# Patient Record
Sex: FEMALE | Race: WHITE | HISPANIC OR LATINO | Employment: FULL TIME | URBAN - METROPOLITAN AREA
[De-identification: names, ages, dates, MRNs, and addresses within clinical notes are randomized per-mention and may not be internally consistent; named-entity substitution may affect disease eponyms.]

---
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
## 2024-04-29 ENCOUNTER — OFFICE VISIT (OUTPATIENT)
Dept: URGENT CARE | Facility: CLINIC | Age: 19
End: 2024-04-29
Payer: MEDICAID

## 2024-04-29 VITALS
OXYGEN SATURATION: 98 % | WEIGHT: 119.3 LBS | TEMPERATURE: 97.7 F | BODY MASS INDEX: 21.95 KG/M2 | HEART RATE: 77 BPM | RESPIRATION RATE: 16 BRPM | DIASTOLIC BLOOD PRESSURE: 64 MMHG | HEIGHT: 62 IN | SYSTOLIC BLOOD PRESSURE: 102 MMHG

## 2024-04-29 DIAGNOSIS — R19.7 DIARRHEA OF PRESUMED INFECTIOUS ORIGIN: ICD-10-CM

## 2024-04-29 DIAGNOSIS — R11.0 NAUSEA: ICD-10-CM

## 2024-04-29 DIAGNOSIS — R35.0 URINARY FREQUENCY: ICD-10-CM

## 2024-04-29 LAB
APPEARANCE UR: NORMAL
BILIRUB UR STRIP-MCNC: NORMAL MG/DL
COLOR UR AUTO: NORMAL
GLUCOSE UR STRIP.AUTO-MCNC: NORMAL MG/DL
KETONES UR STRIP.AUTO-MCNC: 40 MG/DL
LEUKOCYTE ESTERASE UR QL STRIP.AUTO: NORMAL
NITRITE UR QL STRIP.AUTO: NORMAL
PH UR STRIP.AUTO: 5.5 [PH] (ref 5–8)
POCT INT CON NEG: NEGATIVE
POCT INT CON POS: POSITIVE
POCT URINE PREGNANCY TEST: NEGATIVE
PROT UR QL STRIP: NORMAL MG/DL
RBC UR QL AUTO: NORMAL
SP GR UR STRIP.AUTO: 1.03
UROBILINOGEN UR STRIP-MCNC: 0.2 MG/DL

## 2024-04-29 RX ORDER — ONDANSETRON 4 MG/1
4 TABLET, FILM COATED ORAL EVERY 4 HOURS PRN
Qty: 20 TABLET | Refills: 0 | Status: SHIPPED | OUTPATIENT
Start: 2024-04-29 | End: 2024-05-04

## 2024-04-29 ASSESSMENT — ENCOUNTER SYMPTOMS
HEADACHES: 0
FLATUS: 1
BLOOD IN STOOL: 0
FEVER: 0
MUSCULOSKELETAL NEGATIVE: 1
SWEATS: 0
DIARRHEA: 1
HEARTBURN: 1
CARDIOVASCULAR NEGATIVE: 1
RESPIRATORY NEGATIVE: 1
BLOATING: 1
ABDOMINAL PAIN: 1
NAUSEA: 1
COUGH: 0
CHILLS: 0
MYALGIAS: 0
VOMITING: 0
CONSTIPATION: 0
ARTHRALGIAS: 0
CONSTITUTIONAL NEGATIVE: 1

## 2024-04-29 NOTE — LETTER
April 29, 2024         Patient: Merle Mercedes   YOB: 2005   Date of Visit: 4/29/2024           To Whom it May Concern:    Merle Mercedes was seen in my clinic on 4/29/2024. Please excuse any absences from work this week due to acute illness.      If you have any questions or concerns, please don't hesitate to call.        Sincerely,           Remigio Sandra P.A.-C.  Electronically Signed

## 2024-04-29 NOTE — PROGRESS NOTES
Subjective     Merle Mercedes is a very pleasant 18 y.o. female who presents with Nausea, Diarrhea (X 4 days), GI Problem, and Bloated            Diarrhea   This is a new problem. The current episode started in the past 7 days. The problem occurs 5 to 10 times per day. The problem has been unchanged. The stool consistency is described as Watery. Associated symptoms include abdominal pain, bloating and increased flatus. Pertinent negatives include no arthralgias, chills, coughing, fever, headaches, myalgias, sweats, URI or vomiting. There are no known risk factors. She has tried nothing for the symptoms. The treatment provided no relief.       PMH:  has no past medical history on file.  MEDS:   Current Outpatient Medications:     ondansetron (ZOFRAN) 4 MG Tab tablet, Take 1 Tablet by mouth every four hours as needed for Nausea/Vomiting for up to 5 days., Disp: 20 Tablet, Rfl: 0    levonorgestrel (MIRENA, 52 MG,) 20 MCG/DAY IUD, 1 Each by Intrauterine route one time., Disp: , Rfl:   ALLERGIES:   Allergies   Allergen Reactions    Ibuprofen Rash     Possible hives due to ibuprofen use     SURGHX: No past surgical history on file.  SOCHX:  reports that she has never smoked. She has never used smokeless tobacco. She reports that she does not drink alcohol and does not use drugs.  FH: family history is not on file.        Review of Systems   Constitutional: Negative.  Negative for chills and fever.   HENT: Negative.     Respiratory: Negative.  Negative for cough.    Cardiovascular: Negative.    Gastrointestinal:  Positive for abdominal pain, bloating, diarrhea, flatus, heartburn and nausea. Negative for blood in stool, constipation, melena and vomiting.   Genitourinary: Negative.    Musculoskeletal: Negative.  Negative for arthralgias and myalgias.   Neurological:  Negative for headaches.       Medications, Allergies, and current problem list reviewed today in Epic           Objective     /64   Pulse 77   Temp  "36.5 °C (97.7 °F) (Temporal)   Resp 16   Ht 1.575 m (5' 2\")   Wt 54.1 kg (119 lb 4.8 oz)   SpO2 98%   BMI 21.82 kg/m²      Physical Exam  Vitals and nursing note reviewed.   Constitutional:       General: She is not in acute distress.     Appearance: Normal appearance. She is well-developed. She is not ill-appearing, toxic-appearing or diaphoretic.   HENT:      Head: Normocephalic and atraumatic.      Right Ear: Tympanic membrane, ear canal and external ear normal.      Left Ear: Tympanic membrane, ear canal and external ear normal.      Nose: Nose normal. No congestion or rhinorrhea.      Mouth/Throat:      Mouth: Mucous membranes are moist.      Pharynx: No oropharyngeal exudate or posterior oropharyngeal erythema.   Eyes:      General:         Right eye: No discharge.         Left eye: No discharge.      Conjunctiva/sclera: Conjunctivae normal.   Cardiovascular:      Rate and Rhythm: Normal rate and regular rhythm.      Pulses: Normal pulses.      Heart sounds: Normal heart sounds.   Pulmonary:      Effort: Pulmonary effort is normal. No respiratory distress.      Breath sounds: Normal breath sounds. No wheezing, rhonchi or rales.   Abdominal:      General: Abdomen is flat. Bowel sounds are normal. There is no distension.      Palpations: Abdomen is soft.      Tenderness: There is no abdominal tenderness. There is no right CVA tenderness, left CVA tenderness, guarding or rebound. Negative signs include Suarez's sign and McBurney's sign.   Musculoskeletal:      Cervical back: Normal range of motion and neck supple.      Right lower leg: No edema.      Left lower leg: No edema.   Lymphadenopathy:      Cervical: No cervical adenopathy.   Skin:     General: Skin is warm and dry.   Neurological:      General: No focal deficit present.      Mental Status: She is alert and oriented to person, place, and time. Mental status is at baseline.   Psychiatric:         Mood and Affect: Mood normal.         Behavior: " Behavior normal.         Thought Content: Thought content normal.         Judgment: Judgment normal.                             Assessment & Plan     This is a very pleasant 18-year-old female presenting with 3 days of nausea, diarrhea, bloating and increased gas.  Diarrhea was watery without blood or mucus.  She was having some generalized abdominal pain but no sharp stabbing or localizing pain.  No vomiting, fever, bloody stools.  Slight urinary frequency but no dysuria, hematuria, pelvic pain or vaginitis.  Symptoms have improved and now she is eating and drinking.  She does not drink, smoke or other risk factors.  No pertinent past abdominal surgical history.  Vital signs are normal.  Abdomen is soft, nondistended with normal bowel sounds.  No tenderness, rebound or guarding.  Negative Suarez sign and no McBurney's point tenderness.  No CVA tenderness or flank pain.  Entirety of exam benign/reassuring.  In clinic urinalysis and pregnancy negative.  Viral gastroenteritis versus foodborne enteritis.  Certainly there are no signs of acute abdomen today.  GI cocktail given in clinic.  Nausea medication to pharmacy.  OTC meds, fluids, conservative measures and brat diet.  Note for work provided.  AVS illustrating acute abdomen symptoms provided to patient.    1. Urinary frequency  POCT PREGNANCY    POCT Urinalysis    ondansetron (ZOFRAN) 4 MG Tab tablet      2. Diarrhea of presumed infectious origin  ondansetron (ZOFRAN) 4 MG Tab tablet      3. Nausea  ondansetron (ZOFRAN) 4 MG Tab tablet          I personally reviewed prior external notes and test results pertinent to today's visit. Return to clinic or go to ED if symptoms worsen or persist. Red flag symptoms and indications for ED discussed at length. Patient/Parent/Guardian voices understanding.  AVS with post-visit instructions printed and provided or given verbally.  Follow-up with your primary care provider in 3-5 days. All side effects and potential  interactions of prescribed medication discussed including allergic response, GI upset, tendon injury, rash, sedation, OCP effectiveness, etc.    Please note that this dictation was created using voice recognition software. I have made every reasonable attempt to correct obvious errors, but I expect that there are errors of grammar and possibly content that I did not discover before finalizing the note.

## 2024-05-17 ENCOUNTER — OFFICE VISIT (OUTPATIENT)
Dept: URGENT CARE | Facility: CLINIC | Age: 19
End: 2024-05-17
Payer: MEDICAID

## 2024-05-17 VITALS
HEART RATE: 70 BPM | DIASTOLIC BLOOD PRESSURE: 52 MMHG | SYSTOLIC BLOOD PRESSURE: 102 MMHG | TEMPERATURE: 97.8 F | BODY MASS INDEX: 22.32 KG/M2 | RESPIRATION RATE: 16 BRPM | WEIGHT: 121.3 LBS | OXYGEN SATURATION: 98 % | HEIGHT: 62 IN

## 2024-05-17 DIAGNOSIS — H66.001 NON-RECURRENT ACUTE SUPPURATIVE OTITIS MEDIA OF RIGHT EAR WITHOUT SPONTANEOUS RUPTURE OF TYMPANIC MEMBRANE: ICD-10-CM

## 2024-05-17 DIAGNOSIS — B37.31 VULVOVAGINAL CANDIDIASIS: ICD-10-CM

## 2024-05-17 PROCEDURE — 3074F SYST BP LT 130 MM HG: CPT

## 2024-05-17 PROCEDURE — 3078F DIAST BP <80 MM HG: CPT

## 2024-05-17 PROCEDURE — 99213 OFFICE O/P EST LOW 20 MIN: CPT

## 2024-05-17 RX ORDER — FLUCONAZOLE 150 MG/1
150 TABLET ORAL DAILY
Qty: 1 TABLET | Refills: 0 | Status: SHIPPED | OUTPATIENT
Start: 2024-05-17

## 2024-05-17 RX ORDER — AMOXICILLIN AND CLAVULANATE POTASSIUM 875; 125 MG/1; MG/1
1 TABLET, FILM COATED ORAL 2 TIMES DAILY
Qty: 14 TABLET | Refills: 0 | Status: SHIPPED | OUTPATIENT
Start: 2024-05-17 | End: 2024-05-24

## 2024-05-18 NOTE — PROGRESS NOTES
"Subjective:   Merle Mercedes is a 18 y.o. female who presents for Otalgia (Both ears are painful started about 3-4 days ago and thinks possible sinus infection )      HPI:    Patient presents to urgent care with concerns of bilateral ear pain or sinus infection.   States right ear is more painful than the left ear. Pain started 3-4 days ago. Reports sinus symptoms started about one week ago. Denies fever or chills. Denies otorrhea.  Pain radiates to her jaw. Has sinus headache, denies neck pain, photophobia  Reports sinus surgery 6 months ago and a history of recurrent OM . Employed as a . Her son has also been sick with similar illness. Tolerating solids and fluids. Reports normal urinary output. Denies rash.    ROS As above in HPI    Medications:    Current Outpatient Medications on File Prior to Visit   Medication Sig Dispense Refill    levonorgestrel (MIRENA, 52 MG,) 20 MCG/DAY IUD 1 Each by Intrauterine route one time.       No current facility-administered medications on file prior to visit.        Allergies:   Ibuprofen    Problem List:   There is no problem list on file for this patient.       Surgical History:  No past surgical history on file.    Past Social Hx:   Social History     Tobacco Use    Smoking status: Never    Smokeless tobacco: Never   Vaping Use    Vaping status: Never Used   Substance Use Topics    Alcohol use: Never    Drug use: Never          Problem list, medications, and allergies reviewed by myself today in Epic.     Objective:     /52 (BP Location: Left arm, Patient Position: Sitting)   Pulse 70   Temp 36.6 °C (97.8 °F) (Temporal)   Resp 16   Ht 1.575 m (5' 2\")   Wt 55 kg (121 lb 4.8 oz)   SpO2 98%   BMI 22.19 kg/m²     Physical Exam  Vitals and nursing note reviewed.   Constitutional:       General: She is not in acute distress.     Appearance: Normal appearance. She is not ill-appearing.   HENT:      Head: Normocephalic.      Right Ear: Ear canal " normal. A middle ear effusion is present. Tympanic membrane is erythematous and bulging.      Left Ear: Ear canal normal. Tympanic membrane is injected.      Nose: Congestion and rhinorrhea present. Rhinorrhea is clear.      Right Sinus: No maxillary sinus tenderness or frontal sinus tenderness.      Left Sinus: No maxillary sinus tenderness or frontal sinus tenderness.      Mouth/Throat:      Mouth: Mucous membranes are moist.      Pharynx: Oropharynx is clear. Uvula midline. Posterior oropharyngeal erythema (Mild pnd) present. No pharyngeal swelling or oropharyngeal exudate.      Tonsils: No tonsillar exudate.   Cardiovascular:      Rate and Rhythm: Normal rate and regular rhythm.      Heart sounds: Normal heart sounds. No murmur heard.     No friction rub. No gallop.   Pulmonary:      Effort: Pulmonary effort is normal. No respiratory distress.      Breath sounds: Normal breath sounds. No stridor. No wheezing, rhonchi or rales.   Chest:      Chest wall: No tenderness.   Abdominal:      General: Bowel sounds are normal.      Palpations: Abdomen is soft.   Musculoskeletal:      Cervical back: No rigidity or tenderness.   Lymphadenopathy:      Cervical: No cervical adenopathy.   Skin:     General: Skin is warm and dry.      Capillary Refill: Capillary refill takes less than 2 seconds.      Findings: No rash.   Neurological:      Mental Status: She is alert and oriented to person, place, and time.         Assessment/Plan:       Diagnosis and associated orders:   1. Non-recurrent acute suppurative otitis media of right ear without spontaneous rupture of tympanic membrane  - amoxicillin-clavulanate (AUGMENTIN) 875-125 MG Tab; Take 1 Tablet by mouth 2 times a day for 7 days.  Dispense: 14 Tablet; Refill: 0    2. Vulvovaginal candidiasis  - fluconazole (DIFLUCAN) 150 MG tablet; Take 1 Tablet by mouth every day.  Dispense: 1 Tablet; Refill: 0        Comments/MDM:     Tylenol/ibuprofen as needed for pain per package  instructions  Start otc antihistamines, Flonase, nasal rinses.  Reports infections with antibiotic use, dose Diflucan ordered  Return if no improvement in 48 to 72 hours  Follow-up with primary care advised        Return to clinic or go to ED if symptoms worsen or persist. Indications for ED discussed at length. Patient/Parent/Guardian voices understanding. Follow-up with your primary care provider in 3-5 days. Red flag symptoms discussed. All side effects of medication discussed including allergic response, GI upset, tendon injury, rash, sedation etc.    Please note that this dictation was created using voice recognition software. I have made a reasonable attempt to correct obvious errors, but I expect that there are errors of grammar and possibly content that I did not discover before finalizing the note.    This note was electronically signed by HARI Scales

## 2024-05-25 ENCOUNTER — APPOINTMENT (OUTPATIENT)
Dept: RADIOLOGY | Facility: MEDICAL CENTER | Age: 19
End: 2024-05-25
Attending: EMERGENCY MEDICINE
Payer: MEDICAID

## 2024-05-25 ENCOUNTER — HOSPITAL ENCOUNTER (EMERGENCY)
Facility: MEDICAL CENTER | Age: 19
End: 2024-05-25
Attending: EMERGENCY MEDICINE
Payer: MEDICAID

## 2024-05-25 VITALS
OXYGEN SATURATION: 97 % | BODY MASS INDEX: 21.99 KG/M2 | DIASTOLIC BLOOD PRESSURE: 57 MMHG | RESPIRATION RATE: 19 BRPM | HEIGHT: 62 IN | HEART RATE: 99 BPM | WEIGHT: 119.49 LBS | SYSTOLIC BLOOD PRESSURE: 101 MMHG | TEMPERATURE: 99.6 F

## 2024-05-25 DIAGNOSIS — R11.2 NAUSEA AND VOMITING, UNSPECIFIED VOMITING TYPE: ICD-10-CM

## 2024-05-25 DIAGNOSIS — R10.84 GENERALIZED ABDOMINAL PAIN: ICD-10-CM

## 2024-05-25 DIAGNOSIS — R19.7 DIARRHEA, UNSPECIFIED TYPE: ICD-10-CM

## 2024-05-25 DIAGNOSIS — K52.9 GASTROENTERITIS: ICD-10-CM

## 2024-05-25 LAB
ALBUMIN SERPL BCP-MCNC: 4.5 G/DL (ref 3.2–4.9)
ALBUMIN/GLOB SERPL: 1.2 G/DL
ALP SERPL-CCNC: 83 U/L (ref 30–99)
ALT SERPL-CCNC: 14 U/L (ref 2–50)
ANION GAP SERPL CALC-SCNC: 16 MMOL/L (ref 7–16)
APPEARANCE UR: CLEAR
AST SERPL-CCNC: 23 U/L (ref 12–45)
BACTERIA #/AREA URNS HPF: NEGATIVE /HPF
BASOPHILS # BLD AUTO: 0.3 % (ref 0–1.8)
BASOPHILS # BLD: 0.03 K/UL (ref 0–0.12)
BILIRUB SERPL-MCNC: 0.5 MG/DL (ref 0.1–1.5)
BILIRUB UR QL STRIP.AUTO: NEGATIVE
BUN SERPL-MCNC: 11 MG/DL (ref 8–22)
CALCIUM ALBUM COR SERPL-MCNC: 8.7 MG/DL (ref 8.5–10.5)
CALCIUM SERPL-MCNC: 9.1 MG/DL (ref 8.5–10.5)
CHLORIDE SERPL-SCNC: 102 MMOL/L (ref 96–112)
CO2 SERPL-SCNC: 18 MMOL/L (ref 20–33)
COLOR UR: ABNORMAL
CREAT SERPL-MCNC: 0.82 MG/DL (ref 0.5–1.4)
EOSINOPHIL # BLD AUTO: 0.01 K/UL (ref 0–0.51)
EOSINOPHIL NFR BLD: 0.1 % (ref 0–6.9)
EPI CELLS #/AREA URNS HPF: ABNORMAL /HPF
ERYTHROCYTE [DISTWIDTH] IN BLOOD BY AUTOMATED COUNT: 40.3 FL (ref 35.9–50)
FLUAV RNA SPEC QL NAA+PROBE: NEGATIVE
FLUBV RNA SPEC QL NAA+PROBE: NEGATIVE
GFR SERPLBLD CREATININE-BSD FMLA CKD-EPI: 104 ML/MIN/1.73 M 2
GLOBULIN SER CALC-MCNC: 3.7 G/DL (ref 1.9–3.5)
GLUCOSE SERPL-MCNC: 97 MG/DL (ref 65–99)
GLUCOSE UR STRIP.AUTO-MCNC: NEGATIVE MG/DL
HCG SERPL QL: NEGATIVE
HCT VFR BLD AUTO: 44.9 % (ref 37–47)
HGB BLD-MCNC: 16.1 G/DL (ref 12–16)
HYALINE CASTS #/AREA URNS LPF: ABNORMAL /LPF
IMM GRANULOCYTES # BLD AUTO: 0.05 K/UL (ref 0–0.11)
IMM GRANULOCYTES NFR BLD AUTO: 0.5 % (ref 0–0.9)
KETONES UR STRIP.AUTO-MCNC: 40 MG/DL
LACTATE SERPL-SCNC: 1.2 MMOL/L (ref 0.5–2)
LEUKOCYTE ESTERASE UR QL STRIP.AUTO: ABNORMAL
LIPASE SERPL-CCNC: 18 U/L (ref 11–82)
LYMPHOCYTES # BLD AUTO: 0.51 K/UL (ref 1–4.8)
LYMPHOCYTES NFR BLD: 5.6 % (ref 22–41)
MCH RBC QN AUTO: 31.1 PG (ref 27–33)
MCHC RBC AUTO-ENTMCNC: 35.9 G/DL (ref 32.2–35.5)
MCV RBC AUTO: 86.7 FL (ref 81.4–97.8)
MICRO URNS: ABNORMAL
MONOCYTES # BLD AUTO: 0.42 K/UL (ref 0–0.85)
MONOCYTES NFR BLD AUTO: 4.6 % (ref 0–13.4)
NEUTROPHILS # BLD AUTO: 8.08 K/UL (ref 1.82–7.42)
NEUTROPHILS NFR BLD: 88.9 % (ref 44–72)
NITRITE UR QL STRIP.AUTO: NEGATIVE
NRBC # BLD AUTO: 0 K/UL
NRBC BLD-RTO: 0 /100 WBC (ref 0–0.2)
PH UR STRIP.AUTO: 6 [PH] (ref 5–8)
PLATELET # BLD AUTO: 271 K/UL (ref 164–446)
PMV BLD AUTO: 10.1 FL (ref 9–12.9)
POTASSIUM SERPL-SCNC: 3.9 MMOL/L (ref 3.6–5.5)
PROT SERPL-MCNC: 8.2 G/DL (ref 6–8.2)
PROT UR QL STRIP: 30 MG/DL
RBC # BLD AUTO: 5.18 M/UL (ref 4.2–5.4)
RBC # URNS HPF: ABNORMAL /HPF
RBC UR QL AUTO: ABNORMAL
RSV RNA SPEC QL NAA+PROBE: NEGATIVE
SARS-COV-2 RNA RESP QL NAA+PROBE: NOTDETECTED
SODIUM SERPL-SCNC: 136 MMOL/L (ref 135–145)
SP GR UR STRIP.AUTO: 1.03
UROBILINOGEN UR STRIP.AUTO-MCNC: 0.2 MG/DL
WBC # BLD AUTO: 9.1 K/UL (ref 4.8–10.8)
WBC #/AREA URNS HPF: ABNORMAL /HPF

## 2024-05-25 RX ORDER — SODIUM CHLORIDE 9 MG/ML
1000 INJECTION, SOLUTION INTRAVENOUS ONCE
Status: COMPLETED | OUTPATIENT
Start: 2024-05-25 | End: 2024-05-25

## 2024-05-25 RX ORDER — ACETAMINOPHEN 500 MG
1000 TABLET ORAL ONCE
Status: DISCONTINUED | OUTPATIENT
Start: 2024-05-25 | End: 2024-05-25 | Stop reason: HOSPADM

## 2024-05-25 RX ORDER — ONDANSETRON 2 MG/ML
4 INJECTION INTRAMUSCULAR; INTRAVENOUS ONCE
Status: COMPLETED | OUTPATIENT
Start: 2024-05-25 | End: 2024-05-25

## 2024-05-25 RX ORDER — ONDANSETRON 4 MG/1
4 TABLET, ORALLY DISINTEGRATING ORAL EVERY 6 HOURS PRN
Qty: 20 TABLET | Refills: 0 | Status: SHIPPED | OUTPATIENT
Start: 2024-05-25

## 2024-05-25 RX ORDER — SODIUM CHLORIDE 9 MG/ML
1000 INJECTION, SOLUTION INTRAVENOUS ONCE
Status: DISCONTINUED | OUTPATIENT
Start: 2024-05-25 | End: 2024-05-25

## 2024-05-25 RX ADMIN — IOHEXOL 98 ML: 350 INJECTION, SOLUTION INTRAVENOUS at 14:26

## 2024-05-25 RX ADMIN — SODIUM CHLORIDE 1000 ML: 9 INJECTION, SOLUTION INTRAVENOUS at 14:45

## 2024-05-25 RX ADMIN — SODIUM CHLORIDE 1000 ML: 9 INJECTION, SOLUTION INTRAVENOUS at 13:30

## 2024-05-25 RX ADMIN — ONDANSETRON 4 MG: 2 INJECTION INTRAMUSCULAR; INTRAVENOUS at 13:27

## 2024-05-25 NOTE — ED NOTES
ERP at bedside. ERP requesting second liter bolus and if pt's heart rate maintains below 100 pt to discharge.  Second liter bolus initiated. Pt updated on POC. Pt verbalized understanding.

## 2024-05-25 NOTE — ED NOTES
Pt connected to heart monitor. Fluid bolus running.  Pt declined tylenol at this time due to being concerned about taking anything PO.  Pt educated that sips with meds ok.  Pt states she wants to hold off on tylenol for now.  Pt educated to call if she changed her mind.  Pt verbalized understanding.

## 2024-05-25 NOTE — ED NOTES
Pt has discharge orders. Pt educated on discharge instructions.  Pt verbalizes understanding.  PIV removed. Pt ambulatory to lobby with steady gait. Pt going home with friend.

## 2024-05-25 NOTE — ED TRIAGE NOTES
"Chief Complaint   Patient presents with    Flu Like Symptoms     Pt report diarrhea, N/V started this AM. Pt also HA, back pain, chills and unable to keep anything down.    Abdominal Pain     Pt report abdominal and back pain, 8/10.        Pt ambulatory to triage. Pt A&Ox4, for above complaint.     Pt to lobby. Pt educated on alerting staff in changes to condition. Pt verbalized understanding. Protocol initiated.     /53   Pulse (!) 108   Temp 37 °C (98.6 °F) (Temporal)   Resp 18   Ht 1.575 m (5' 2\")   Wt 54.2 kg (119 lb 7.8 oz)   SpO2 97%   BMI 21.85 kg/m²     "

## 2024-05-25 NOTE — ED PROVIDER NOTES
"ED Provider Note    Scribed for Rosario Suarez M.D. by Tara Alvares. 5/25/2024, 12:55 PM.    Primary care provider: None noted  Means of arrival: Walk-in  History obtained from: Patient  History limited by: None    CHIEF COMPLAINT  Chief Complaint   Patient presents with    Flu Like Symptoms     Pt report diarrhea, N/V started this AM. Pt also HA, back pain, chills and unable to keep anything down.    Abdominal Pain     Pt report abdominal and back pain, 8/10.        HPI/ROS  Merle Mercedes is a 18 y.o. female who presents to the Emergency Department nausea, vomiting and diarrhea onset this morning. She reports associated chills, headache, body aches and diffuse abdominal pain radiating into her back. She states she has been intermittently sick with flu-like symptoms for 3 weeks.  She notes approximately 3 weeks ago she was diagnosed with \"a stomach bug\" and was discharged with Zofran.  Then approximately 1 week ago she had a sinus infection over the last week and recently finished antibiotics.  Her symptoms of abdominal pain, nausea and vomiting returned yesterday evening.  She denies any burning with urination. The patient has an IUD. She states she last ate last night at 10:30 PM.     EXTERNAL RECORDS REVIEWED  Patient seen on 4/29/2024 for diarrheal illness and prescribe Zofran.  Patient again seen at urgent care on 5/17/2024 and diagnosed with otitis media for which she was treated with Augmentin.    LIMITATION TO HISTORY   None    OUTSIDE HISTORIAN(S):  Boyfriend      PAST MEDICAL HISTORY   None noted    SURGICAL HISTORY  patient denies any surgical history    SOCIAL HISTORY  Social History     Tobacco Use    Smoking status: Never    Smokeless tobacco: Never   Vaping Use    Vaping status: Never Used   Substance Use Topics    Alcohol use: Never    Drug use: Never      Social History     Substance and Sexual Activity   Drug Use Never       FAMILY HISTORY  History reviewed. No pertinent family " "history.    CURRENT MEDICATIONS  Home Medications       Reviewed by Austin Valverde R.N. (Registered Nurse) on 05/25/24 at 1244  Med List Status: Partial     Medication Last Dose Status   fluconazole (DIFLUCAN) 150 MG tablet  Active   levonorgestrel (MIRENA, 52 MG,) 20 MCG/DAY IUD  Active                    ALLERGIES  Allergies   Allergen Reactions    Ibuprofen Rash     Possible hives due to ibuprofen use       PHYSICAL EXAM  VITAL SIGNS: /62   Pulse (!) 107   Temp 37 °C (98.6 °F) (Temporal)   Resp 16   Ht 1.575 m (5' 2\")   Wt 54.2 kg (119 lb 7.8 oz)   SpO2 98%   BMI 21.85 kg/m²   Vitals reviewed by myself.  Nursing note and vitals reviewed.  Constitutional: Well-developed and well-nourished. No acute distress.   HENT: Head is normocephalic and atraumatic.  Eyes: extra-ocular movements intact  Cardiovascular: Tachycardic rate and regular rhythm. No murmur heard.  Pulmonary/Chest: Breath sounds normal. No wheezes or rales.   Abdominal: Soft and Mild tenderness in the right lower quadrant. No distention.  No involuntary guarding  Musculoskeletal: Extremities exhibit normal range of motion without edema or tenderness.   Neurological: Awake and alert  Skin: Skin is warm and dry. No rash.     DIAGNOSTIC STUDIES:  LABS  Labs Reviewed   CBC WITH DIFFERENTIAL - Abnormal; Notable for the following components:       Result Value    Hemoglobin 16.1 (*)     MCHC 35.9 (*)     Neutrophils-Polys 88.90 (*)     Lymphocytes 5.60 (*)     Neutrophils (Absolute) 8.08 (*)     Lymphs (Absolute) 0.51 (*)     All other components within normal limits   COMP METABOLIC PANEL - Abnormal; Notable for the following components:    Co2 18 (*)     Globulin 3.7 (*)     All other components within normal limits   URINALYSIS,CULTURE IF INDICATED - Abnormal; Notable for the following components:    Ketones 40 (*)     Protein 30 (*)     Leukocyte Esterase Trace (*)     Occult Blood Trace (*)     All other components within normal limits "   URINE MICROSCOPIC (W/UA) - Abnormal; Notable for the following components:    RBC 5-10 (*)     All other components within normal limits   LIPASE   HCG QUAL SERUM   LACTIC ACID   COV-2, FLU A/B, AND RSV BY PCR ([facility])   ESTIMATED GFR   URINE CULTURE(NEW)   BLOOD CULTURE   BLOOD CULTURE   POC COV-2, FLU A/B, RSV BY PCR       All labs reviewed and independently interpreted by myself    RADIOLOGY  Images independently interpreted by myself prior to radiologist review:  -CT demonstrates nonobstructive pattern, no obvious appendicitis    Final interpretation by radiology demonstrates:    CT-ABDOMEN-PELVIS WITH   Final Result      1.  Increased small bowel and colonic fluid suggesting gastroenteritis.   2.  No bowel obstruction or perforation   3.  No secondary signs of acute appendicitis, however the appendix is not visualized.   4.  Ill-defined low-attenuation lesion in the inferior RIGHT lobe liver possibly indicating hemangioma.  Consider confirmation with nonemergent ultrasound.        The radiologist's interpretation of all radiological studies have been reviewed by me.    COURSE & MEDICAL DECISION MAKING    INITIAL ASSESSMENT, ED COURSE AND PLAN    HYDRATION: Based on the patient's presentation of Acute Vomiting the patient was given IV fluids. IV Hydration was used because oral hydration was not adequate alone. Upon recheck following hydration, the patient was improved.    Patient is a 18-year-old female who presents for evaluation of nausea, vomiting and diarrhea.  Differential diagnosis includes foodborne illness, gastroenteritis, colitis, appendicitis, dehydration, electrolyte derangement.  Diagnostic workup includes labs and CT of the abdomen.    Patient's initial vitals notable for slight tachycardia.  Patient is treated with IV fluids and Zofran after which she feels greatly improved.  Labs returned are independently interpreted by myself to demonstrate:  -No leukocytosis making bacterial pathology less  likely, hemoglobin is slightly elevated likely secondary to hemoconcentration from dehydration, patient is being treated with IV fluids  - HCG negative, patient not pregnant  -Electrolytes and renal function within normal limits, metabolic panel is notable for slightly decreased bicarb likely secondary to dehydration for which patient is being treated with IV fluids  -Viral swabs negative for flu/COVID/RSV  -Urinalysis negative for infection, there are ketones and protein in the urine consistent with dehydration for which patient is being treated with IV fluids  -Labs otherwise unremarkable    CT of the abdomen returns and is consistent with gastroenteritis.  She is noted to have incidental liver lobe lesion which she is advised will need outpatient follow-up, likely hemangioma however patient needs ultrasound outpatient.  Upon reassessment patient is feeling improved and tolerating oral intake.  Therefore she is reassured and advised on management of viral gastroenteritis.  She is given strict return precautions and discharged in stable condition.    DISPOSITION AND DISCUSSIONS  I have discussed management of the patient with the following physicians and BASILIA's:  none    Discussion of management with other QHP or appropriate source(s): none     Escalation of care considered, and ultimately not performed:see above    Barriers to care at this time, including but not limited to: none.     Decision tools and prescription drugs considered including, but not limited to: see above.        FINAL IMPRESSION  1. Gastroenteritis    2. Nausea and vomiting, unspecified vomiting type    3. Generalized abdominal pain    4. Diarrhea, unspecified type          Tara LINO (Zofia), am scribing for, and in the presence of, Rosario Suarez M.D..    Electronically signed by: Tara Alvares (Zofia), 5/25/2024    IRosario M.D. personally performed the services described in this documentation, as scribed by Tara Alvares in my  presence, and it is both accurate and complete.    The note accurately reflects work and decisions made by me.  Rosario Suarez M.D.  5/25/2024  3:02 PM

## 2024-05-25 NOTE — ED NOTES
Rounded on pt. Pt's fluid bolus almost complete.  Pt's HR maintaining below 100 in the 90s.  Pt updated that once bolus complete, pt to discharge.

## 2024-05-25 NOTE — DISCHARGE INSTRUCTIONS
As we discussed the CT scan showed a small lesion in your liver that can be followed up with an outpatient ultrasound by your primary care doctor

## 2024-05-27 LAB
BACTERIA UR CULT: NORMAL
SIGNIFICANT IND 70042: NORMAL
SITE SITE: NORMAL
SOURCE SOURCE: NORMAL

## 2024-05-30 LAB
BACTERIA BLD CULT: NORMAL
BACTERIA BLD CULT: NORMAL
SIGNIFICANT IND 70042: NORMAL
SIGNIFICANT IND 70042: NORMAL
SITE SITE: NORMAL
SITE SITE: NORMAL
SOURCE SOURCE: NORMAL
SOURCE SOURCE: NORMAL

## 2025-02-14 ENCOUNTER — OFFICE VISIT (OUTPATIENT)
Dept: URGENT CARE | Facility: CLINIC | Age: 20
End: 2025-02-14
Payer: MEDICAID

## 2025-02-14 VITALS
RESPIRATION RATE: 18 BRPM | WEIGHT: 130.7 LBS | BODY MASS INDEX: 24.05 KG/M2 | DIASTOLIC BLOOD PRESSURE: 70 MMHG | HEIGHT: 62 IN | OXYGEN SATURATION: 98 % | TEMPERATURE: 98.1 F | SYSTOLIC BLOOD PRESSURE: 110 MMHG | HEART RATE: 101 BPM

## 2025-02-14 DIAGNOSIS — J06.9 VIRAL URI: ICD-10-CM

## 2025-02-14 LAB
FLUAV RNA SPEC QL NAA+PROBE: NEGATIVE
FLUBV RNA SPEC QL NAA+PROBE: NEGATIVE
RSV RNA SPEC QL NAA+PROBE: NEGATIVE
SARS-COV-2 RNA RESP QL NAA+PROBE: NEGATIVE

## 2025-02-14 PROCEDURE — 87637 SARSCOV2&INF A&B&RSV AMP PRB: CPT | Mod: QW | Performed by: NURSE PRACTITIONER

## 2025-02-14 PROCEDURE — 99213 OFFICE O/P EST LOW 20 MIN: CPT | Performed by: NURSE PRACTITIONER

## 2025-02-14 PROCEDURE — 3078F DIAST BP <80 MM HG: CPT | Performed by: NURSE PRACTITIONER

## 2025-02-14 PROCEDURE — 3074F SYST BP LT 130 MM HG: CPT | Performed by: NURSE PRACTITIONER

## 2025-02-14 ASSESSMENT — ENCOUNTER SYMPTOMS
RHINORRHEA: 1
NAUSEA: 0
HEADACHES: 1
COUGH: 1
SORE THROAT: 1
CHILLS: 1
SINUS PAIN: 1
FEVER: 0

## 2025-02-14 ASSESSMENT — FIBROSIS 4 INDEX: FIB4 SCORE: 0.48

## 2025-02-15 NOTE — PROGRESS NOTES
"Subjective:   Merle Mercedes is a 19 y.o. female who presents for Sinusitis (X1day facial pressure/ear pain/low grade fever/)      URI   This is a new problem. The current episode started yesterday. The problem has been unchanged. Associated symptoms include congestion, coughing, headaches, rhinorrhea, sinus pain and a sore throat. Pertinent negatives include no ear pain, nausea or plugged ear sensation. She has tried acetaminophen for the symptoms.       Review of Systems   Constitutional:  Positive for chills and malaise/fatigue. Negative for fever.   HENT:  Positive for congestion, rhinorrhea, sinus pain and sore throat. Negative for ear pain.    Respiratory:  Positive for cough.    Gastrointestinal:  Negative for nausea.   Neurological:  Positive for headaches.       Medications:    fluconazole  Mirena (52 MG) Iud  ondansetron Tbdp    Allergies: Ibuprofen    Problem List: Merle Mercedes does not have a problem list on file.    Surgical History:  No past surgical history on file.    Past Social Hx: Merle Mercedes  reports that she has never smoked. She has never used smokeless tobacco. She reports that she does not drink alcohol and does not use drugs.     Past Family Hx:  Merle Mercedes family history is not on file.     Problem list, medications, and allergies reviewed by myself today in Epic.     Objective:     /70   Pulse (!) 101   Temp 36.7 °C (98.1 °F) (Temporal)   Resp 18   Ht 1.575 m (5' 2\")   Wt 59.3 kg (130 lb 11.2 oz)   LMP  (Exact Date)   SpO2 98%   BMI 23.91 kg/m²     Physical Exam  Vitals and nursing note reviewed.   Constitutional:       General: She is not in acute distress.     Appearance: She is well-developed.   HENT:      Head: Normocephalic and atraumatic.      Right Ear: Tympanic membrane and external ear normal.      Left Ear: Tympanic membrane and external ear normal.      Nose: Nose normal.      Right Sinus: No maxillary sinus tenderness or frontal sinus " tenderness.      Left Sinus: No maxillary sinus tenderness or frontal sinus tenderness.      Mouth/Throat:      Mouth: Mucous membranes are moist.      Pharynx: Uvula midline. No posterior oropharyngeal erythema.      Tonsils: No tonsillar exudate or tonsillar abscesses.   Eyes:      General:         Right eye: No discharge.         Left eye: No discharge.      Conjunctiva/sclera: Conjunctivae normal.   Cardiovascular:      Rate and Rhythm: Normal rate.   Pulmonary:      Effort: Pulmonary effort is normal. No respiratory distress.      Breath sounds: Normal breath sounds.   Abdominal:      General: There is no distension.   Musculoskeletal:         General: Normal range of motion.   Skin:     General: Skin is warm and dry.   Neurological:      General: No focal deficit present.      Mental Status: She is alert and oriented to person, place, and time. Mental status is at baseline.      Gait: Gait (gait at baseline) normal.   Psychiatric:         Judgment: Judgment normal.         Assessment/Plan:     Diagnosis and associated orders:     1. Viral URI  POCT CoV-2, Flu A/B, RSV by PCR         Comments/MDM:     Results for orders placed or performed in visit on 02/14/25   POCT CoV-2, Flu A/B, RSV by PCR    Collection Time: 02/14/25  8:07 PM   Result Value Ref Range    SARS-CoV-2 by PCR Negative Negative, Invalid    Influenza virus A RNA Negative Negative, Invalid    Influenza virus B, PCR Negative Negative, Invalid    RSV, PCR Negative Negative, Invalid       It was explained today that due to the viral nature of the pt's illness, we will treat symptomatically today.   Encouraged OTC supportive meds PRN. Humidification, increase fluids.   Discussed side effects of OTC meds and any prescribed.  Given precautionary s/sx that mandate immediate follow up and evaluation in the ED. Advised of risks of not doing so.    DDX, Supportive care, and indications for immediate follow-up discussed with patient.    Instructed to return  to clinic or nearest emergency department if we are not available for any change in condition, further concerns, or worsening of symptoms.    The patient  and/or guardian demonstrated a good understanding and agreed with the treatment plan.             Please note that this dictation was created using voice recognition software. I have made a reasonable attempt to correct obvious errors, but I expect that there are errors of grammar and possibly content that I did not discover before finalizing the note.    This note was electronically signed by Foreign NORIEGA.

## 2025-04-02 ENCOUNTER — OFFICE VISIT (OUTPATIENT)
Dept: URGENT CARE | Facility: CLINIC | Age: 20
End: 2025-04-02
Payer: MEDICAID

## 2025-04-02 VITALS
HEART RATE: 90 BPM | HEIGHT: 62 IN | SYSTOLIC BLOOD PRESSURE: 114 MMHG | BODY MASS INDEX: 24.46 KG/M2 | DIASTOLIC BLOOD PRESSURE: 66 MMHG | OXYGEN SATURATION: 98 % | TEMPERATURE: 98.3 F | WEIGHT: 132.9 LBS | RESPIRATION RATE: 14 BRPM

## 2025-04-02 DIAGNOSIS — H61.23 BILATERAL IMPACTED CERUMEN: ICD-10-CM

## 2025-04-02 DIAGNOSIS — H92.03 EAR PAIN, BILATERAL: ICD-10-CM

## 2025-04-02 PROCEDURE — 3074F SYST BP LT 130 MM HG: CPT | Performed by: PHYSICIAN ASSISTANT

## 2025-04-02 PROCEDURE — 99214 OFFICE O/P EST MOD 30 MIN: CPT | Performed by: PHYSICIAN ASSISTANT

## 2025-04-02 PROCEDURE — 3078F DIAST BP <80 MM HG: CPT | Performed by: PHYSICIAN ASSISTANT

## 2025-04-02 RX ORDER — ACETAMINOPHEN 500 MG
500-1000 TABLET ORAL EVERY 8 HOURS PRN
Qty: 30 TABLET | Refills: 0 | Status: SHIPPED | OUTPATIENT
Start: 2025-04-02

## 2025-04-02 ASSESSMENT — FIBROSIS 4 INDEX: FIB4 SCORE: 0.48

## 2025-04-02 ASSESSMENT — ENCOUNTER SYMPTOMS
CHILLS: 0
COUGH: 0
NAUSEA: 0
FEVER: 0
VOMITING: 0

## 2025-04-03 NOTE — PROGRESS NOTES
Subjective:   Merle Mercedes is a 19 y.o. female who presents for Ear Pain (R ear side Is muffled, pain in both ears , runny nose )        Patient presents with concerns of left ear pain and muffled hearing on the right side for the last week.  Symptoms have been waxing and waning but overall seem to be worsening.  No fevers, chills.  No pain with moving the ears or laying on the ears.  Patient is not taking any medications for her symptoms.      Review of Systems   Constitutional:  Negative for chills and fever.   HENT:  Positive for ear pain and hearing loss.    Respiratory:  Negative for cough.    Gastrointestinal:  Negative for nausea and vomiting.       PMH:  has no past medical history on file.  MEDS:   Current Outpatient Medications:     acetaminophen (TYLENOL) 500 MG Tab, Take 1-2 Tablets by mouth every 8 hours as needed for Moderate Pain., Disp: 30 Tablet, Rfl: 0    carbamide peroxide (DEBROX) 6.5 % Solution, Administer 6 Drops into affected ear(s) 2 times a day. Administer drops in both ears., Disp: 15 mL, Rfl: 0    levonorgestrel (MIRENA, 52 MG,) 20 MCG/DAY IUD, 1 Each by Intrauterine route one time., Disp: , Rfl:     ondansetron (ZOFRAN ODT) 4 MG TABLET DISPERSIBLE, Take 1 Tablet by mouth every 6 hours as needed for Nausea/Vomiting. (Patient not taking: Reported on 4/2/2025), Disp: 20 Tablet, Rfl: 0    fluconazole (DIFLUCAN) 150 MG tablet, Take 1 Tablet by mouth every day. (Patient not taking: Reported on 4/2/2025), Disp: 1 Tablet, Rfl: 0  ALLERGIES:   Allergies   Allergen Reactions    Ibuprofen Rash     Possible hives due to ibuprofen use     SURGHX: No past surgical history on file.  SOCHX:  reports that she has never smoked. She has never used smokeless tobacco. She reports that she does not drink alcohol and does not use drugs.  FH: Family history was reviewed, no pertinent findings to report   Objective:   /66   Pulse 90   Temp 36.8 °C (98.3 °F) (Temporal)   Resp 14   Ht 1.575 m (5'  "2\")   Wt 60.3 kg (132 lb 14.4 oz)   SpO2 98%   BMI 24.31 kg/m²   Physical Exam  Vitals reviewed.   Constitutional:       General: She is not in acute distress.     Appearance: Normal appearance. She is well-developed. She is not toxic-appearing.   HENT:      Head: Normocephalic and atraumatic.      Right Ear: External ear normal.      Left Ear: External ear normal.      Ears:      Comments: Complete bilateral cerumen impactions.  Visualized portion of EAC is nonedematous and nonerythematous bilaterally.     Nose: Nose normal.   Cardiovascular:      Rate and Rhythm: Normal rate and regular rhythm.   Pulmonary:      Effort: Pulmonary effort is normal. No respiratory distress.      Breath sounds: No stridor.   Skin:     General: Skin is dry.   Neurological:      Comments: Alert and oriented.    Psychiatric:         Speech: Speech normal.         Behavior: Behavior normal.           Assessment/Plan:   1. Ear pain, bilateral  - acetaminophen (TYLENOL) 500 MG Tab; Take 1-2 Tablets by mouth every 8 hours as needed for Moderate Pain.  Dispense: 30 Tablet; Refill: 0  - carbamide peroxide (DEBROX) 6.5 % Solution; Administer 6 Drops into affected ear(s) 2 times a day. Administer drops in both ears.  Dispense: 15 mL; Refill: 0    2. Bilateral impacted cerumen  - acetaminophen (TYLENOL) 500 MG Tab; Take 1-2 Tablets by mouth every 8 hours as needed for Moderate Pain.  Dispense: 30 Tablet; Refill: 0  - carbamide peroxide (DEBROX) 6.5 % Solution; Administer 6 Drops into affected ear(s) 2 times a day. Administer drops in both ears.  Dispense: 15 mL; Refill: 0    Due to cerumen impactions I am unable to evaluate patient for possible middle ear infection.  No evidence of OE on exam today.  Is possible that the impactions are causing her symptoms.  Despite multiple attempts we are unable to disimpact the ears.  Will have patient try home disimpaction over the next few days with Debrox drops.  If this is still unsuccessful I would " like her to return to clinic so that we can reattempt to clear the ears.  May take Tylenol as needed for pain.  I also recommend immediate reevaluation with any new or worsening symptoms.